# Patient Record
Sex: FEMALE | URBAN - METROPOLITAN AREA
[De-identification: names, ages, dates, MRNs, and addresses within clinical notes are randomized per-mention and may not be internally consistent; named-entity substitution may affect disease eponyms.]

---

## 2021-04-20 ENCOUNTER — OFFICE VISIT (OUTPATIENT)
Dept: URBAN - METROPOLITAN AREA CLINIC 113 | Facility: CLINIC | Age: 36
End: 2021-04-20

## 2021-04-20 NOTE — HPI-OTHER HISTORIES
Labs/12/21:H/H 12.6/37.9, MCV 90, , WBC 4.8.  AST 72, ALT 38, ALP 92, T bili 0.5, CMP otherwise unremarkable.